# Patient Record
Sex: FEMALE | Race: OTHER | NOT HISPANIC OR LATINO
[De-identification: names, ages, dates, MRNs, and addresses within clinical notes are randomized per-mention and may not be internally consistent; named-entity substitution may affect disease eponyms.]

---

## 2017-03-27 ENCOUNTER — LABORATORY RESULT (OUTPATIENT)
Age: 42
End: 2017-03-27

## 2017-03-27 ENCOUNTER — APPOINTMENT (OUTPATIENT)
Dept: INFECTIOUS DISEASE | Facility: CLINIC | Age: 42
End: 2017-03-27

## 2017-03-27 VITALS
DIASTOLIC BLOOD PRESSURE: 89 MMHG | WEIGHT: 147 LBS | HEART RATE: 102 BPM | OXYGEN SATURATION: 96 % | TEMPERATURE: 98.2 F | HEIGHT: 62 IN | SYSTOLIC BLOOD PRESSURE: 131 MMHG | BODY MASS INDEX: 27.05 KG/M2

## 2017-03-27 DIAGNOSIS — R76.8 OTHER SPECIFIED ABNORMAL IMMUNOLOGICAL FINDINGS IN SERUM: ICD-10-CM

## 2017-03-27 DIAGNOSIS — R53.81 OTHER MALAISE: ICD-10-CM

## 2017-03-27 DIAGNOSIS — R53.83 OTHER MALAISE: ICD-10-CM

## 2017-03-28 ENCOUNTER — TRANSCRIPTION ENCOUNTER (OUTPATIENT)
Age: 42
End: 2017-03-28

## 2017-03-30 ENCOUNTER — OTHER (OUTPATIENT)
Age: 42
End: 2017-03-30

## 2017-03-30 LAB
ALBUMIN SERPL ELPH-MCNC: 4.1 G/DL
ALP BLD-CCNC: 65 U/L
ALT SERPL-CCNC: 8 U/L
ANA SER IF-ACNC: NEGATIVE
ANION GAP SERPL CALC-SCNC: 14 MMOL/L
AST SERPL-CCNC: 19 U/L
B BURGDOR IGG+IGM SER QL IB: NORMAL
BASOPHILS # BLD AUTO: 0.01 K/UL
BASOPHILS NFR BLD AUTO: 0.2 %
BILIRUB SERPL-MCNC: 0.4 MG/DL
BUN SERPL-MCNC: 9 MG/DL
CALCIUM SERPL-MCNC: 9.4 MG/DL
CHLORIDE SERPL-SCNC: 102 MMOL/L
CO2 SERPL-SCNC: 22 MMOL/L
CREAT SERPL-MCNC: 0.87 MG/DL
CRP SERPL-MCNC: <0.2 MG/DL
EOSINOPHIL # BLD AUTO: 0.12 K/UL
EOSINOPHIL NFR BLD AUTO: 1.9 %
ERYTHROCYTE [SEDIMENTATION RATE] IN BLOOD BY WESTERGREN METHOD: 23 MM/HR
GLUCOSE SERPL-MCNC: 85 MG/DL
HCT VFR BLD CALC: 43.3 %
HGB BLD-MCNC: 14.1 G/DL
IMM GRANULOCYTES NFR BLD AUTO: 0 %
LYMPHOCYTES # BLD AUTO: 2.11 K/UL
LYMPHOCYTES NFR BLD AUTO: 33.3 %
MAN DIFF?: NORMAL
MCHC RBC-ENTMCNC: 29.9 PG
MCHC RBC-ENTMCNC: 32.6 GM/DL
MCV RBC AUTO: 91.9 FL
MONOCYTES # BLD AUTO: 0.3 K/UL
MONOCYTES NFR BLD AUTO: 4.7 %
NEUTROPHILS # BLD AUTO: 3.8 K/UL
NEUTROPHILS NFR BLD AUTO: 59.9 %
PLATELET # BLD AUTO: 252 K/UL
POTASSIUM SERPL-SCNC: 4 MMOL/L
PROT SERPL-MCNC: 7.8 G/DL
RBC # BLD: 4.71 M/UL
RBC # FLD: 13.6 %
SODIUM SERPL-SCNC: 138 MMOL/L
TSH SERPL-ACNC: 0.79 UIU/ML
WBC # FLD AUTO: 6.34 K/UL

## 2017-05-04 ENCOUNTER — RX RENEWAL (OUTPATIENT)
Age: 42
End: 2017-05-04

## 2017-05-05 ENCOUNTER — TRANSCRIPTION ENCOUNTER (OUTPATIENT)
Age: 42
End: 2017-05-05

## 2017-05-15 ENCOUNTER — APPOINTMENT (OUTPATIENT)
Dept: PULMONOLOGY | Facility: CLINIC | Age: 42
End: 2017-05-15

## 2017-05-15 VITALS
HEIGHT: 62 IN | BODY MASS INDEX: 27.05 KG/M2 | DIASTOLIC BLOOD PRESSURE: 89 MMHG | TEMPERATURE: 98.4 F | OXYGEN SATURATION: 99 % | HEART RATE: 105 BPM | WEIGHT: 147 LBS | RESPIRATION RATE: 15 BRPM | SYSTOLIC BLOOD PRESSURE: 130 MMHG

## 2017-05-15 DIAGNOSIS — R09.02 HYPOXEMIA: ICD-10-CM

## 2017-05-15 DIAGNOSIS — Z91.018 ALLERGY TO OTHER FOODS: ICD-10-CM

## 2017-05-15 DIAGNOSIS — E78.5 HYPERLIPIDEMIA, UNSPECIFIED: ICD-10-CM

## 2017-05-15 LAB
BASE EXCESS BLDA CALC-SCNC: -3.2
BLOOD GAS COMMENTS ARTERIAL: NORMAL
GAS PNL BLDA: NORMAL
HCO3 BLDA-SCNC: 20.7
HOROWITZ INDEX BLDA+IHG-RTO: NORMAL
PCO2 BLDA: 33.9
PH BLDA: 7.4
PO2 BLDA: 80
SAO2 % BLDA: NORMAL

## 2017-05-15 RX ORDER — SODIUM PICOSULFATE, MAGNESIUM OXIDE, AND ANHYDROUS CITRIC ACID 10; 3.5; 12 MG/16.2G; G/16.2G; G/16.2G
10-3.5-12 POWDER, METERED ORAL
Qty: 2 | Refills: 0 | Status: ACTIVE | COMMUNITY
Start: 2017-03-30

## 2017-05-15 RX ORDER — FLUOCINONIDE 0.5 MG/G
0.05 CREAM TOPICAL
Qty: 60 | Refills: 0 | Status: ACTIVE | COMMUNITY
Start: 2016-05-09

## 2017-05-15 RX ORDER — DEXAMETHASONE 1 MG/1
1 TABLET ORAL DAILY
Qty: 30 | Refills: 3 | Status: ACTIVE | COMMUNITY
Start: 2017-05-15 | End: 1900-01-01

## 2017-11-21 ENCOUNTER — APPOINTMENT (OUTPATIENT)
Dept: MAMMOGRAPHY | Facility: IMAGING CENTER | Age: 42
End: 2017-11-21

## 2017-11-22 ENCOUNTER — APPOINTMENT (OUTPATIENT)
Dept: PULMONOLOGY | Facility: CLINIC | Age: 42
End: 2017-11-22
Payer: COMMERCIAL

## 2017-11-22 VITALS
WEIGHT: 158 LBS | TEMPERATURE: 98.4 F | BODY MASS INDEX: 29.08 KG/M2 | DIASTOLIC BLOOD PRESSURE: 76 MMHG | HEART RATE: 84 BPM | HEIGHT: 62 IN | SYSTOLIC BLOOD PRESSURE: 119 MMHG | OXYGEN SATURATION: 95 %

## 2017-11-22 DIAGNOSIS — J45.909 UNSPECIFIED ASTHMA, UNCOMPLICATED: ICD-10-CM

## 2017-11-22 DIAGNOSIS — R06.2 WHEEZING: ICD-10-CM

## 2017-11-22 DIAGNOSIS — J38.5 LARYNGEAL SPASM: ICD-10-CM

## 2017-11-22 DIAGNOSIS — J45.41 MODERATE PERSISTENT ASTHMA WITH (ACUTE) EXACERBATION: ICD-10-CM

## 2017-11-22 DIAGNOSIS — R06.02 SHORTNESS OF BREATH: ICD-10-CM

## 2017-11-22 LAB
BASE EXCESS BLDA CALC-SCNC: -1.8
BLOOD GAS COMMENTS ARTERIAL: NORMAL
GAS PNL BLDA: NORMAL
HCO3 BLDA-SCNC: 21.7
HOROWITZ INDEX BLDA+IHG-RTO: NORMAL
PCO2 BLDA: 32
PH BLDA: 7.45
PO2 BLDA: 85
SAO2 % BLDA: 97

## 2017-11-22 PROCEDURE — 94664 DEMO&/EVAL PT USE INHALER: CPT

## 2017-11-22 PROCEDURE — 36600 WITHDRAWAL OF ARTERIAL BLOOD: CPT | Mod: 59

## 2017-11-22 PROCEDURE — 82803 BLOOD GASES ANY COMBINATION: CPT

## 2017-11-22 PROCEDURE — 99215 OFFICE O/P EST HI 40 MIN: CPT | Mod: 25

## 2017-11-22 RX ORDER — METHYLPREDNISOLONE 4 MG/1
4 TABLET ORAL
Qty: 1 | Refills: 1 | Status: ACTIVE | COMMUNITY
Start: 2017-11-22 | End: 1900-01-01

## 2017-11-22 RX ORDER — UMECLIDINIUM 62.5 UG/1
62.5 AEROSOL, POWDER ORAL DAILY
Qty: 1 | Refills: 3 | Status: ACTIVE | COMMUNITY
Start: 2017-11-22 | End: 1900-01-01

## 2017-11-22 RX ORDER — LEVALBUTEROL HYDROCHLORIDE 0.63 MG/3ML
0.63 SOLUTION RESPIRATORY (INHALATION)
Qty: 4 | Refills: 3 | Status: ACTIVE | COMMUNITY
Start: 2017-11-22 | End: 1900-01-01

## 2017-11-22 RX ORDER — MUPIROCIN 20 MG/G
2 OINTMENT TOPICAL
Qty: 22 | Refills: 0 | Status: ACTIVE | COMMUNITY
Start: 2017-10-26

## 2018-01-11 ENCOUNTER — APPOINTMENT (OUTPATIENT)
Dept: RHEUMATOLOGY | Facility: CLINIC | Age: 43
End: 2018-01-11

## 2018-08-13 ENCOUNTER — APPOINTMENT (OUTPATIENT)
Dept: MAMMOGRAPHY | Facility: IMAGING CENTER | Age: 43
End: 2018-08-13

## 2018-10-02 ENCOUNTER — OUTPATIENT (OUTPATIENT)
Dept: OUTPATIENT SERVICES | Facility: HOSPITAL | Age: 43
LOS: 1 days | End: 2018-10-02
Payer: COMMERCIAL

## 2018-10-02 ENCOUNTER — APPOINTMENT (OUTPATIENT)
Dept: MAMMOGRAPHY | Facility: IMAGING CENTER | Age: 43
End: 2018-10-02
Payer: COMMERCIAL

## 2018-10-02 DIAGNOSIS — Z00.8 ENCOUNTER FOR OTHER GENERAL EXAMINATION: ICD-10-CM

## 2018-10-02 DIAGNOSIS — Z98.89 OTHER SPECIFIED POSTPROCEDURAL STATES: Chronic | ICD-10-CM

## 2018-10-02 DIAGNOSIS — Z98.82 BREAST IMPLANT STATUS: Chronic | ICD-10-CM

## 2018-10-02 PROCEDURE — 77067 SCR MAMMO BI INCL CAD: CPT | Mod: 26

## 2018-10-02 PROCEDURE — 77063 BREAST TOMOSYNTHESIS BI: CPT | Mod: 26

## 2018-10-02 PROCEDURE — 77063 BREAST TOMOSYNTHESIS BI: CPT

## 2018-10-02 PROCEDURE — 77067 SCR MAMMO BI INCL CAD: CPT

## 2018-10-05 ENCOUNTER — APPOINTMENT (OUTPATIENT)
Dept: MAMMOGRAPHY | Facility: IMAGING CENTER | Age: 43
End: 2018-10-05
Payer: COMMERCIAL

## 2018-10-05 ENCOUNTER — OUTPATIENT (OUTPATIENT)
Dept: OUTPATIENT SERVICES | Facility: HOSPITAL | Age: 43
LOS: 1 days | End: 2018-10-05
Payer: COMMERCIAL

## 2018-10-05 DIAGNOSIS — R92.2 INCONCLUSIVE MAMMOGRAM: ICD-10-CM

## 2018-10-05 DIAGNOSIS — Z98.82 BREAST IMPLANT STATUS: Chronic | ICD-10-CM

## 2018-10-05 DIAGNOSIS — Z98.89 OTHER SPECIFIED POSTPROCEDURAL STATES: Chronic | ICD-10-CM

## 2018-10-05 PROCEDURE — G0279: CPT | Mod: 26

## 2018-10-05 PROCEDURE — 77065 DX MAMMO INCL CAD UNI: CPT | Mod: 26,RT

## 2018-10-05 PROCEDURE — 76641 ULTRASOUND BREAST COMPLETE: CPT | Mod: 26,50

## 2018-10-05 PROCEDURE — 77065 DX MAMMO INCL CAD UNI: CPT

## 2018-10-05 PROCEDURE — G0279: CPT

## 2018-10-05 PROCEDURE — 76641 ULTRASOUND BREAST COMPLETE: CPT

## 2018-10-11 ENCOUNTER — APPOINTMENT (OUTPATIENT)
Dept: ULTRASOUND IMAGING | Facility: IMAGING CENTER | Age: 43
End: 2018-10-11
Payer: COMMERCIAL

## 2018-10-11 ENCOUNTER — RESULT REVIEW (OUTPATIENT)
Age: 43
End: 2018-10-11

## 2018-10-11 ENCOUNTER — OUTPATIENT (OUTPATIENT)
Dept: OUTPATIENT SERVICES | Facility: HOSPITAL | Age: 43
LOS: 1 days | End: 2018-10-11
Payer: COMMERCIAL

## 2018-10-11 DIAGNOSIS — Z98.89 OTHER SPECIFIED POSTPROCEDURAL STATES: Chronic | ICD-10-CM

## 2018-10-11 DIAGNOSIS — Z98.82 BREAST IMPLANT STATUS: Chronic | ICD-10-CM

## 2018-10-11 DIAGNOSIS — Z00.8 ENCOUNTER FOR OTHER GENERAL EXAMINATION: ICD-10-CM

## 2018-10-11 PROCEDURE — 19083 BX BREAST 1ST LESION US IMAG: CPT

## 2018-10-11 PROCEDURE — 88305 TISSUE EXAM BY PATHOLOGIST: CPT | Mod: 26

## 2018-10-11 PROCEDURE — 77065 DX MAMMO INCL CAD UNI: CPT

## 2018-10-11 PROCEDURE — 88305 TISSUE EXAM BY PATHOLOGIST: CPT

## 2018-10-11 PROCEDURE — 77065 DX MAMMO INCL CAD UNI: CPT | Mod: 26,RT

## 2018-10-11 PROCEDURE — 19083 BX BREAST 1ST LESION US IMAG: CPT | Mod: RT

## 2019-03-13 ENCOUNTER — TRANSCRIPTION ENCOUNTER (OUTPATIENT)
Age: 44
End: 2019-03-13

## 2019-04-01 PROBLEM — J38.5 LARYNGEAL STRIDOR: Status: ACTIVE | Noted: 2017-11-22

## 2019-04-01 PROBLEM — J38.5 LARYNGEAL STRIDOR: Status: RESOLVED | Noted: 2017-11-22 | Resolved: 2019-04-01

## 2019-08-09 ENCOUNTER — APPOINTMENT (OUTPATIENT)
Dept: RADIOLOGY | Facility: HOSPITAL | Age: 44
End: 2019-08-09

## 2019-08-09 ENCOUNTER — OUTPATIENT (OUTPATIENT)
Dept: OUTPATIENT SERVICES | Facility: HOSPITAL | Age: 44
LOS: 1 days | End: 2019-08-09
Payer: COMMERCIAL

## 2019-08-09 DIAGNOSIS — Z98.89 OTHER SPECIFIED POSTPROCEDURAL STATES: Chronic | ICD-10-CM

## 2019-08-09 DIAGNOSIS — N97.1 FEMALE INFERTILITY OF TUBAL ORIGIN: ICD-10-CM

## 2019-08-09 DIAGNOSIS — Z98.82 BREAST IMPLANT STATUS: Chronic | ICD-10-CM

## 2019-08-09 PROCEDURE — 58340 CATHETER FOR HYSTEROGRAPHY: CPT

## 2019-08-09 PROCEDURE — 74740 X-RAY FEMALE GENITAL TRACT: CPT | Mod: 26

## 2019-08-09 PROCEDURE — 74740 X-RAY FEMALE GENITAL TRACT: CPT

## 2019-11-04 ENCOUNTER — APPOINTMENT (OUTPATIENT)
Dept: MAMMOGRAPHY | Facility: CLINIC | Age: 44
End: 2019-11-04

## 2019-11-04 ENCOUNTER — APPOINTMENT (OUTPATIENT)
Dept: ULTRASOUND IMAGING | Facility: CLINIC | Age: 44
End: 2019-11-04

## 2020-01-03 ENCOUNTER — APPOINTMENT (OUTPATIENT)
Dept: MAMMOGRAPHY | Facility: IMAGING CENTER | Age: 45
End: 2020-01-03

## 2020-01-03 ENCOUNTER — APPOINTMENT (OUTPATIENT)
Dept: ULTRASOUND IMAGING | Facility: IMAGING CENTER | Age: 45
End: 2020-01-03

## 2020-04-18 ENCOUNTER — APPOINTMENT (OUTPATIENT)
Dept: DISASTER EMERGENCY | Facility: CLINIC | Age: 45
End: 2020-04-18
Payer: COMMERCIAL

## 2020-04-18 VITALS
SYSTOLIC BLOOD PRESSURE: 118 MMHG | WEIGHT: 165 LBS | BODY MASS INDEX: 30.36 KG/M2 | HEIGHT: 62 IN | DIASTOLIC BLOOD PRESSURE: 88 MMHG | HEART RATE: 118 BPM | RESPIRATION RATE: 15 BRPM | OXYGEN SATURATION: 97 % | TEMPERATURE: 99.3 F

## 2020-04-18 DIAGNOSIS — U07.1 COVID-19: ICD-10-CM

## 2020-04-18 DIAGNOSIS — R68.89 OTHER GENERAL SYMPTOMS AND SIGNS: ICD-10-CM

## 2020-04-18 PROCEDURE — 99213 OFFICE O/P EST LOW 20 MIN: CPT

## 2020-04-19 LAB — SARS-COV-2 N GENE NPH QL NAA+PROBE: NOT DETECTED

## 2020-05-02 PROBLEM — R68.89 SUSPECTED COVID-19 VIRUS INFECTION: Status: ACTIVE | Noted: 2020-05-02

## 2020-05-02 NOTE — HISTORY OF PRESENT ILLNESS
[Patient presents to the office today for COVID-19 evaluation and testing.] : Patient presents to the office today for COVID-19 evaluation and testing. [Patient has been pre-screened by RN at call center for appointment today with our facility.] : Patient has been pre-screened by RN at call center for appointment today with our facility. [] : no dizziness on standing [None Known] : none known [Lung Disease] : lung disease [None] : none [Clear] : clear [Normal O2 sat at rest] : normal O2 sat at rest [Grossly normal, interacts, not tired or weak] : grossly normal, interacts, not tired or weak [COVID-19 testing ordered and specimen obtained] : COVID-19 testing ordered and specimen obtained [Discharged with current Quarantine instructions and advised of signs of worsening illness.] : Patient discharged with current quarantine instructions and advised of signs of worsening illness. Patient told to seek emergent care if symptoms occur. [FreeTextEntry1] : This is a 44 year old female who presents for COVID 19 visit:\par c/o shortness of breath at rest, wheezing, cough, fever, headache x 3 days.\par Pt is an asthmatic - takes symbicort daily, has nebulizer treatment at home. \par Lives w/  & son, has been in quarantine at home except for grocery shopping. \par \par \par \par  [TextBox_107] : Patient education provided for COVID-19. Explained if symptoms such as SOB or fever progress, patient instructed to go to ED. Discussed isolation precautions and handwashing techniques. Social distancing reviewed. Utilize Tylenol for fever >101. No signs of cardiovascular decompensation. Patient verbalized understanding of provided instructions. Tests results may take up to 3-7 days to result. Discussed possibility of false negative results. Instructed to self quarantine and must remain quarantined x 14 days and no fever for three days without antipyretic medication. All patient close contacts should also self quarantine. Social distancing once quarantine is completed. If patient has symptoms of chest discomfort, severe SOB at rest, worsening shortness of breath with minimal exertion, new or worsening wheezing, dizziness were instructed to seek immediate medical evaluation. \par \par COVID-19 nasal swab preformed and ordered in office today. Advised test results may take 3-7 days to return. Discussed diagnostic accuracy and possibility of false negative results. Instructed to self quarantine and must remain quarantined x 14 days and no fever for 3 days without antifever medications. All patients close contacts should also self quarantine. Close contacts with comorbidities at higher risk of COVID disease. Social distancing once quarantine is completed. If high risk symptoms of chest discomfort, severe shortness of breath at rest, worsening shortness of breath with minimal exertion, new or worsening wheezing, dizziness then instructed to seek immediate medical evaluation. A letter for work and patient education form was provided. The above plan was reviewed with the patient. All questions have been answered. Instructed to call if any new symptoms.

## 2021-03-29 ENCOUNTER — APPOINTMENT (OUTPATIENT)
Dept: ULTRASOUND IMAGING | Facility: IMAGING CENTER | Age: 46
End: 2021-03-29

## 2021-03-29 ENCOUNTER — APPOINTMENT (OUTPATIENT)
Dept: MAMMOGRAPHY | Facility: IMAGING CENTER | Age: 46
End: 2021-03-29

## 2021-09-25 ENCOUNTER — APPOINTMENT (OUTPATIENT)
Dept: MAMMOGRAPHY | Facility: CLINIC | Age: 46
End: 2021-09-25
Payer: COMMERCIAL

## 2021-09-25 ENCOUNTER — OUTPATIENT (OUTPATIENT)
Dept: OUTPATIENT SERVICES | Facility: HOSPITAL | Age: 46
LOS: 1 days | End: 2021-09-25
Payer: COMMERCIAL

## 2021-09-25 ENCOUNTER — APPOINTMENT (OUTPATIENT)
Dept: ULTRASOUND IMAGING | Facility: CLINIC | Age: 46
End: 2021-09-25
Payer: COMMERCIAL

## 2021-09-25 DIAGNOSIS — Z00.8 ENCOUNTER FOR OTHER GENERAL EXAMINATION: ICD-10-CM

## 2021-09-25 DIAGNOSIS — Z98.89 OTHER SPECIFIED POSTPROCEDURAL STATES: Chronic | ICD-10-CM

## 2021-09-25 DIAGNOSIS — Z98.82 BREAST IMPLANT STATUS: Chronic | ICD-10-CM

## 2021-09-25 PROCEDURE — 76641 ULTRASOUND BREAST COMPLETE: CPT | Mod: 26,50

## 2021-09-25 PROCEDURE — 77067 SCR MAMMO BI INCL CAD: CPT

## 2021-09-25 PROCEDURE — 77067 SCR MAMMO BI INCL CAD: CPT | Mod: 26

## 2021-09-25 PROCEDURE — 77063 BREAST TOMOSYNTHESIS BI: CPT

## 2021-09-25 PROCEDURE — 76641 ULTRASOUND BREAST COMPLETE: CPT

## 2021-09-25 PROCEDURE — 77063 BREAST TOMOSYNTHESIS BI: CPT | Mod: 26

## 2022-02-26 ENCOUNTER — EMERGENCY (EMERGENCY)
Facility: HOSPITAL | Age: 47
LOS: 1 days | Discharge: HOME | End: 2022-02-26
Attending: STUDENT IN AN ORGANIZED HEALTH CARE EDUCATION/TRAINING PROGRAM | Admitting: STUDENT IN AN ORGANIZED HEALTH CARE EDUCATION/TRAINING PROGRAM
Payer: COMMERCIAL

## 2022-02-26 VITALS
DIASTOLIC BLOOD PRESSURE: 90 MMHG | TEMPERATURE: 98 F | SYSTOLIC BLOOD PRESSURE: 132 MMHG | HEART RATE: 102 BPM | RESPIRATION RATE: 16 BRPM | OXYGEN SATURATION: 100 %

## 2022-02-26 VITALS
RESPIRATION RATE: 16 BRPM | SYSTOLIC BLOOD PRESSURE: 144 MMHG | DIASTOLIC BLOOD PRESSURE: 90 MMHG | TEMPERATURE: 98 F | OXYGEN SATURATION: 100 % | HEART RATE: 86 BPM

## 2022-02-26 DIAGNOSIS — Z98.89 OTHER SPECIFIED POSTPROCEDURAL STATES: Chronic | ICD-10-CM

## 2022-02-26 DIAGNOSIS — Z98.82 BREAST IMPLANT STATUS: Chronic | ICD-10-CM

## 2022-02-26 PROCEDURE — 99283 EMERGENCY DEPT VISIT LOW MDM: CPT

## 2022-02-26 RX ORDER — CEFPODOXIME PROXETIL 100 MG
400 TABLET ORAL EVERY 12 HOURS
Refills: 0 | Status: DISCONTINUED | OUTPATIENT
Start: 2022-02-26 | End: 2022-03-01

## 2022-02-26 RX ORDER — CEFPODOXIME PROXETIL 100 MG
200 TABLET ORAL EVERY 12 HOURS
Refills: 0 | Status: DISCONTINUED | OUTPATIENT
Start: 2022-02-26 | End: 2022-02-26

## 2022-02-26 RX ORDER — OXYCODONE AND ACETAMINOPHEN 5; 325 MG/1; MG/1
1 TABLET ORAL ONCE
Refills: 0 | Status: DISCONTINUED | OUTPATIENT
Start: 2022-02-26 | End: 2022-02-26

## 2022-02-26 RX ORDER — CEFDINIR 250 MG/5ML
1 POWDER, FOR SUSPENSION ORAL
Qty: 14 | Refills: 0
Start: 2022-02-26 | End: 2022-03-04

## 2022-02-26 RX ADMIN — Medication 100 MILLIGRAM(S): at 04:14

## 2022-02-26 RX ADMIN — OXYCODONE AND ACETAMINOPHEN 1 TABLET(S): 5; 325 TABLET ORAL at 04:14

## 2022-02-26 RX ADMIN — Medication 400 MILLIGRAM(S): at 04:17

## 2022-02-26 NOTE — ED PROVIDER NOTE - OBJECTIVE STATEMENT
46yF PMHx asthma and MRSA abscess to the scalp 4 months ago presents with erythema, pain and swelling to the R jaw x3 days. Pt had a pimple which she popped after which the swelling and pain gradually developed. Pt denies fever, drainage from the area, jaw claudication, pain with chewing, pain in the teeth, injury to the area.

## 2022-02-26 NOTE — ED PROVIDER NOTE - NSICDXPASTSURGICALHX_GEN_ALL_CORE_FT
PAST SURGICAL HISTORY:  Delivered by  section     S/P bilateral breast implants     S/P laparoscopic cholecystectomy

## 2022-02-26 NOTE — ED ADULT NURSE NOTE - HISTORY OF COVID-19 VACCINATION
Mother it calling back again waiting at pharmacy still no script  Please call when this has been faxed to pharmacy  Thank you   Yes

## 2022-02-26 NOTE — ED PROVIDER NOTE - PROGRESS NOTE DETAILS
Francisco Javier AGUILAR, EM/IM PGY-1: No drainable collection, multiple tiny fluid pockets, will attempt to needle aspirate and DC with Abx that cover MRSA.

## 2022-02-26 NOTE — ED PROVIDER NOTE - CLINICAL SUMMARY MEDICAL DECISION MAKING FREE TEXT BOX
46yF PMHx asthma and MRSA abscess presents with new jaw pain and swelling. Vital signs normal, exam shows area or tender, red swelling with fluctuance and pustule. Will give Pain medication, US to check for drainable collection.

## 2022-02-26 NOTE — ED PROVIDER NOTE - NSFOLLOWUPINSTRUCTIONS_ED_ALL_ED_FT
take acetaminophen 650 mg every 6 hours and ibuprofen 400 mg every 6 hours with food. Both of these medications work differently to treat pain and inflammation.     Abscess    WHAT YOU NEED TO KNOW:    A warm compress may help your abscess drain. Your healthcare provider may make a cut in the abscess so it can drain. You may need surgery to remove an abscess that is on your hands or buttocks.  Skin Abscess    DISCHARGE INSTRUCTIONS:    Return to the emergency department if:    The area around your abscess becomes very painful, warm, or has red streaks.    You have a fever and chills.    Your heart is beating faster than usual.    You feel faint or confused.  Call your doctor if:    Your abscess gets bigger or does not get better.    Your abscess returns.    You have questions or concerns about your condition or care.  Medicines: You may need any of the following:    Antibiotics help treat a bacterial infection.    Acetaminophen decreases pain and fever. It is available without a doctor's order. Ask how much to take and how often to take it. Follow directions. Read the labels of all other medicines you are using to see if they also contain acetaminophen, or ask your doctor or pharmacist. Acetaminophen can cause liver damage if not taken correctly. Do not use more than 4 grams (4,000 milligrams) total of acetaminophen in one day.    NSAIDs, such as ibuprofen, help decrease swelling, pain, and fever. This medicine is available with or without a doctor's order. NSAIDs can cause stomach bleeding or kidney problems in certain people. If you take blood thinner medicine, always ask your healthcare provider if NSAIDs are safe for you. Always read the medicine label and follow directions.    Take your medicine as directed. Contact your healthcare provider if you think your medicine is not helping or if you have side effects. Tell him or her if you are allergic to any medicine. Keep a list of the medicines, vitamins, and herbs you take. Include the amounts, and when and why you take them. Bring the list or the pill bottles to follow-up visits. Carry your medicine list with you in case of an emergency.  Self-care:    Apply a warm compress to your abscess. This will help it open and drain. Wet a washcloth in warm, but not hot, water. Apply the compress for 10 minutes. Repeat this 4 times each day. Do not press on an abscess or try to open it with a needle. You may push the bacteria deeper or into your blood.    Do not share your clothes, towels, or sheets with anyone. This can spread the infection to others.    Wash your hands often. This can help prevent the spread of germs. Use soap and water or an alcohol-based hand rub.  Handwashing  Care for your wound after it is drained:    Care for your wound as directed. If your healthcare provider says it is okay, carefully remove the bandage and gauze packing. You may need to soak the gauze to get it out of your wound. Clean your wound and the area around it as directed. Dry the area and put on new, clean bandages. Change your bandages when they get wet or dirty.    Ask your healthcare provider how to change the gauze in your wound. Keep track of how many pieces of gauze are placed inside the wound. Do not put too much packing in the wound. Do not pack the gauze too tightly in your wound.  Follow up with your healthcare provider in 1 to 3 days: You may need to have your packing removed or your bandage changed. Write down your questions so you remember to ask them during your visits.

## 2022-02-26 NOTE — ED PROVIDER NOTE - ATTENDING CONTRIBUTION TO CARE
I (Christiano) agree with above, I performed a history and physical. Counseled meghana medical staff, physician assistant, and/or medical student on medical decision making as documented. Medical decisions and treatment interventions were made in real time during the patient encounter. Additionally and/or with the following exceptions: The patient presented to the ED with swelling and pain to angle of right mandible near mentum, no systemic infectious signs, has a history of mrsa abscess in the past. Needle aspiration performed with return of ~0.10mL of exudate. Given pain control, double covered with cefdinir and doxy, strict return precautions for possible scalpal I and D; however suspect that the needle aspiration will be sufficient. Return precautions reviewed. Patient verbalized understand of conditions for return and plan for follow up.

## 2022-02-26 NOTE — ED PROVIDER NOTE - PHYSICAL EXAMINATION
GENERAL: Sitting comfortably in bed in no acute distress  NEURO: Alert and Oriented to person, place, date and situation. Pupils symmetric, No ptosis. No facial asymmetry or dysarthria, no tremor noted.  HEENT: No conjunctival injection or scleral icterus. R jaw 1in x 1in erythematous edematous dome shaped lesion, tender to light touch, pinpoint pustule, no drainage. No tenderness to any teeth, swelling or injury inside the mouth, tenderness or swelling underneath the tongue. One 0.5cm diameter lymph node, mobile, underneath the R jaw.  CARD: Normal rate and regular rhythm, no murmurs and no gallops appreciated.  RESP: Clear to auscultation bilaterally, No wheezes, rales or rhonchi. Good respiratory effort.  ABD: Bowel sounds active. Nondistended, Soft and nontender to palpation in all quadrants, no guarding, no rigidity. No masses appreciated.

## 2022-02-26 NOTE — ED PROVIDER NOTE - NS ED ROS FT
CONSTITUTIONAL - No fever, No weight change, No lightheadedness  SKIN - No rash, (+) swelilng and erythema of the jaw  HEMATOLOGIC - No abnormal bleeding or bruising  EYES - No eye pain, No blurred vision  ENT - No change in hearing, No sore throat, No neck pain, No rhinorrhea, No ear pain  RESPIRATORY - No shortness of breath, No cough  CARDIAC -No chest pain, No palpitations  GI - No abdominal pain, No nausea, No vomiting, No diarrhea, No constipation  - No dysuria, no frequency, no hematuria.   MUSCULOSKELETAL - No joint pain, No swelling, No back pain  NEUROLOGIC - No numbness, No focal weakness, No headache, No dizziness

## 2022-02-26 NOTE — ED ADULT NURSE NOTE - OBJECTIVE STATEMENT
Pt received to room 14 a/o x 3 c/o right face abscess x 3 days. Pt states it started out as a pimple that she tried to pop it. Pt  states it progressively got worse and is painful. Respirations even and unlabored. Lung sounds clear with equal chest rise bilaterally. No complaints of chest pain, headache, nausea, dizziness, vomiting  SOB, fever, chills verbalized.

## 2022-02-26 NOTE — ED PROVIDER NOTE - PATIENT PORTAL LINK FT
You can access the FollowMyHealth Patient Portal offered by [facility] by registering at the following website: [email or web address]. By joining [employer]’s FollowMyHealth portal, you will also be able to view your health information using other applications (apps) compatible with our system.

## 2022-04-07 ENCOUNTER — APPOINTMENT (OUTPATIENT)
Dept: MAMMOGRAPHY | Facility: CLINIC | Age: 47
End: 2022-04-07

## 2022-04-07 ENCOUNTER — APPOINTMENT (OUTPATIENT)
Dept: ULTRASOUND IMAGING | Facility: CLINIC | Age: 47
End: 2022-04-07

## 2022-05-15 ENCOUNTER — NON-APPOINTMENT (OUTPATIENT)
Age: 47
End: 2022-05-15

## 2022-09-14 ENCOUNTER — APPOINTMENT (OUTPATIENT)
Dept: MRI IMAGING | Facility: CLINIC | Age: 47
End: 2022-09-14

## 2022-10-05 ENCOUNTER — OUTPATIENT (OUTPATIENT)
Dept: OUTPATIENT SERVICES | Facility: HOSPITAL | Age: 47
LOS: 1 days | End: 2022-10-05
Payer: COMMERCIAL

## 2022-10-05 ENCOUNTER — APPOINTMENT (OUTPATIENT)
Dept: ULTRASOUND IMAGING | Facility: CLINIC | Age: 47
End: 2022-10-05

## 2022-10-05 ENCOUNTER — APPOINTMENT (OUTPATIENT)
Dept: MAMMOGRAPHY | Facility: CLINIC | Age: 47
End: 2022-10-05

## 2022-10-05 DIAGNOSIS — Z00.8 ENCOUNTER FOR OTHER GENERAL EXAMINATION: ICD-10-CM

## 2022-10-05 DIAGNOSIS — Z98.82 BREAST IMPLANT STATUS: Chronic | ICD-10-CM

## 2022-10-05 DIAGNOSIS — Z98.89 OTHER SPECIFIED POSTPROCEDURAL STATES: Chronic | ICD-10-CM

## 2022-10-05 PROCEDURE — 76641 ULTRASOUND BREAST COMPLETE: CPT | Mod: 26,50

## 2022-10-05 PROCEDURE — 77067 SCR MAMMO BI INCL CAD: CPT | Mod: 26

## 2022-10-05 PROCEDURE — 77063 BREAST TOMOSYNTHESIS BI: CPT | Mod: 26

## 2022-10-05 PROCEDURE — 77063 BREAST TOMOSYNTHESIS BI: CPT

## 2022-10-05 PROCEDURE — 77067 SCR MAMMO BI INCL CAD: CPT

## 2022-10-05 PROCEDURE — 76641 ULTRASOUND BREAST COMPLETE: CPT

## 2023-01-28 ENCOUNTER — EMERGENCY (EMERGENCY)
Facility: HOSPITAL | Age: 48
LOS: 1 days | Discharge: HOME | End: 2023-01-28
Attending: STUDENT IN AN ORGANIZED HEALTH CARE EDUCATION/TRAINING PROGRAM | Admitting: STUDENT IN AN ORGANIZED HEALTH CARE EDUCATION/TRAINING PROGRAM
Payer: COMMERCIAL

## 2023-01-28 VITALS
TEMPERATURE: 99 F | RESPIRATION RATE: 16 BRPM | DIASTOLIC BLOOD PRESSURE: 95 MMHG | OXYGEN SATURATION: 98 % | SYSTOLIC BLOOD PRESSURE: 146 MMHG | HEART RATE: 127 BPM

## 2023-01-28 DIAGNOSIS — Z98.82 BREAST IMPLANT STATUS: Chronic | ICD-10-CM

## 2023-01-28 DIAGNOSIS — Z98.89 OTHER SPECIFIED POSTPROCEDURAL STATES: Chronic | ICD-10-CM

## 2023-01-28 PROCEDURE — 99284 EMERGENCY DEPT VISIT MOD MDM: CPT

## 2023-01-28 NOTE — ED ADULT TRIAGE NOTE - CHIEF COMPLAINT QUOTE
alert oriented c/o low back pain radiating to both thighs x 1 month also c/o pelvic pain  has been urinating frequently no dysuria  c/o dizziness started 1 week ago  PMHx asthma

## 2023-01-29 VITALS
OXYGEN SATURATION: 100 % | SYSTOLIC BLOOD PRESSURE: 122 MMHG | DIASTOLIC BLOOD PRESSURE: 71 MMHG | RESPIRATION RATE: 16 BRPM | TEMPERATURE: 98 F | HEART RATE: 98 BPM

## 2023-01-29 LAB
APPEARANCE UR: CLEAR — SIGNIFICANT CHANGE UP
BACTERIA # UR AUTO: ABNORMAL
BILIRUB UR-MCNC: NEGATIVE — SIGNIFICANT CHANGE UP
COLOR SPEC: YELLOW — SIGNIFICANT CHANGE UP
DIFF PNL FLD: ABNORMAL
EPI CELLS # UR: 3 /HPF — SIGNIFICANT CHANGE UP (ref 0–5)
GLUCOSE UR QL: NEGATIVE — SIGNIFICANT CHANGE UP
HYALINE CASTS # UR AUTO: 1 /LPF — SIGNIFICANT CHANGE UP (ref 0–7)
KETONES UR-MCNC: ABNORMAL
LEUKOCYTE ESTERASE UR-ACNC: NEGATIVE — SIGNIFICANT CHANGE UP
NITRITE UR-MCNC: NEGATIVE — SIGNIFICANT CHANGE UP
PH UR: 6 — SIGNIFICANT CHANGE UP (ref 5–8)
PROT UR-MCNC: ABNORMAL
RBC CASTS # UR COMP ASSIST: 9 /HPF — HIGH (ref 0–4)
SP GR SPEC: 1.03 — SIGNIFICANT CHANGE UP (ref 1.01–1.05)
UROBILINOGEN FLD QL: SIGNIFICANT CHANGE UP
WBC UR QL: 3 /HPF — SIGNIFICANT CHANGE UP (ref 0–5)

## 2023-01-29 PROCEDURE — 72100 X-RAY EXAM L-S SPINE 2/3 VWS: CPT | Mod: 26

## 2023-01-29 RX ORDER — KETOROLAC TROMETHAMINE 30 MG/ML
15 SYRINGE (ML) INJECTION ONCE
Refills: 0 | Status: DISCONTINUED | OUTPATIENT
Start: 2023-01-29 | End: 2023-01-29

## 2023-01-29 RX ORDER — LIDOCAINE 4 G/100G
1 CREAM TOPICAL ONCE
Refills: 0 | Status: COMPLETED | OUTPATIENT
Start: 2023-01-29 | End: 2023-01-29

## 2023-01-29 RX ORDER — CYCLOBENZAPRINE HYDROCHLORIDE 10 MG/1
1 TABLET, FILM COATED ORAL
Qty: 9 | Refills: 0
Start: 2023-01-29 | End: 2023-01-31

## 2023-01-29 RX ADMIN — Medication 15 MILLIGRAM(S): at 01:47

## 2023-01-29 RX ADMIN — LIDOCAINE 1 PATCH: 4 CREAM TOPICAL at 01:47

## 2023-01-29 NOTE — ED PROVIDER NOTE - PROGRESS NOTE DETAILS
Tunde Garcia MD PGY-1: Pt reassessed. Pain is much better now a 5/10, pt is able to sit and lie on her back. Pt feels comfortable going home and following up with her PCP. Xray and UA results explained, nonactionable.

## 2023-01-29 NOTE — ED PROVIDER NOTE - WR ORDER DATE AND TIME 1
Reason for Disposition   COVID-19 vaccine, systemic reactions (e.g., fatigue, fever, muscle aches), questions about    Answer Assessment - Initial Assessment Questions  1. MAIN CONCERN OR SYMPTOM:  \"What is your main concern right now? \" \"What question do you have? \" \"What's the main symptom you're worried about? \" (e.g., fever, pain, redness, swelling)      Fever    2. VACCINE: \"What vaccination did you receive? \" \"Is this your first or second shot? \" (e.g., none; Stephanie Alex, other)      J&J - 1st and only shot    3. SYMPTOM ONSET: \"When did the fever begin? \" (e.g., not relevant; hours, days)       Around 6 hours ago    4. SYMPTOM SEVERITY: \"How bad is it? \"       Patient reports her entire body feels bad    5. FEVER: \"Is there a fever? \" If so, ask: \"What is it, how was it measured, and when did it start? \"       Most recent temperature was 102.1, oral, noticed around 7pm    6. PAST REACTIONS: \"Have you reacted to immunizations before? \" If so, ask: \"What happened? \"      Denies hx of reactions to other vaccines    7. OTHER SYMPTOMS: \"Do you have any other symptoms? \"      Dizziness, body aches, joint pain    Protocols used: CORONAVIRUS (COVID-19) VACCINE QUESTIONS AND REACTIONS-ADULT-    Call received on [address]. Care advice provided. Recommended using local department of health website for testing locations and up to date information. Caller verbalizes understanding, denies any other questions or concerns; instructed to call back for any new or worsening symptoms. 29-Jan-2023 01:22

## 2023-01-29 NOTE — ED ADULT NURSE NOTE - OBJECTIVE STATEMENT
47 yof presents A&Ox4 c/o b/l lower back pain radiating to b/l legs x1 month and urinary frequency x 1 wk. Denies any abdomen pain, diarrhea, vomiting or fevers.PMHx asthma. Respirations even and unlabored, sating 100% on RA, abdomen non tender x4 quadrants. pt denies any chest pain, sob, N/V/D. No acute distress noted. MD at bedside performing assessment. Pending further orders. bed in lowest position, side rails up, call bell in hand, safety maintained.

## 2023-01-29 NOTE — ED PROVIDER NOTE - NS ED ROS FT
REVIEW OF SYSTEMS:     CONSTITUTIONAL: No fever, weight loss, or fatigue  EYES: No eye pain, visual disturbances, or discharge  ENMT:  No difficulty hearing, tinnitus, vertigo; No sinus or throat pain  NECK: No pain or stiffness  RESPIRATORY: No cough, wheezing, chills or hemoptysis; No shortness of breath  CARDIOVASCULAR: No chest pain, palpitations, +dizziness, no leg swelling  GASTROINTESTINAL: No abdominal or epigastric pain. No nausea, vomiting, or hematemesis; No diarrhea or constipation. No melena or hematochezia.  GENITOURINARY: No dysuria, +frequency, no hematuria, or incontinence  NEUROLOGICAL: No headaches, memory loss, loss of strength, numbness, or tremors  SKIN: No itching, burning, rashes, or lesions  MSK: +back pain

## 2023-01-29 NOTE — ED ADULT NURSE NOTE - BEFAST SPEECH PHRASE
Pt admitted to Inpatient status for NSTEMI with Troponin significantly elevated.  Cardiology consulted with Heparin infusion initiated.  EKG positive for ST depression.  Pt placed on BIPAP due to respiratory distress and later weaned to high flow oxygen.  Pulmonology consulted.  Chest xray verified pulmonary congestion with positive response to diuresis noted.  BNP elevated. Pt noted to renal failure with Creatinine of 4 and Nephrology consulted.  Baseline creatinine 2.5-3 noted.  Echo results showed EF 45% with diastolic dysfunction, PAP 69, +WMA, Pulm HTN, and mild to moderate MR.  LHC planned for tomorrow. H/H 8.2/25.3 with 1 unit of PRBCs transfused.  Procalcitonin of 0.66.  Blood culture results pending.  Pt denies acute distress at this time. As of 11/30 R/LHC scheduled today showed Lad prox 90% mid 50% lcx prox 50% rca diffusely diseased 70% normal filling pressures moderate pulmonary htn. Cardiology recommending CABG vs LAD arthrectomy stent. CVT consulted, not amenable to CABG due to severe calcification and high risk of morbidity and mortality. 12/3/18- Awaiting LHC today with intervention of LAD. Denies any chest pain or SOB. 12/4/18- S/p PCI to LAD. Tolerated procedure well. Renal function stable this morning. Plans inplace for OP Nephrology follow up and HD access implant. Patient to follow-up with PCP in 3 days for hospital follow-up of NSTEMI. Patient also to follow-up with cardiology, nephrology, and vascular surgery outpatient. Patient seen and examined on the date of discharge and found suitable for discharge.     Yes

## 2023-01-29 NOTE — ED PROVIDER NOTE - OBJECTIVE STATEMENT
47yF w/PMHx asthma and psoriasis presents with x1 month of atraumatic, intermittent lower back pain that radiates to b/l legs to mid thighs laterally. Pain is described as sometimes sharp, sometimes throbbing was a 6/10 in the beginning of the month now a 10/10. Pt can no longer sit on her butt due to the pain and has to sleep on her side as laying on her back exacerbates the pain. Pt saw PCP for this and was prescribed celecoxib 200mg once a day for the pain. Pt took it for 2 weeks and had no relief so discontinued it herself. Pt states 800mg ibuprofen provides some relief. Pt also endorsing x1 week of increased frequency of urination but no burning/dysuria. Denies saddle anesthesia, denies bowel or bladder loss of control/incontinence. Pt felt a couple seconds of dizziness yesterday due to the pain however that resolved. Denies fevers, +chills, denies cough, congestion, rhinorrhea, cp, sob, abd pain, n/v/c/d.

## 2023-01-29 NOTE — ED POST DISCHARGE NOTE - RESULT SUMMARY
Xray lumber spine: Spondylosis with degenerative apperaing spondylolisthesis of L5 and S1. Consider MRI of the lumbar spine for further evaluation. Patient contacted discussed with patient becca results and to follow up with MD and consider having MRI. Emailed results to [email or web address] so patient can show to MD. Discussed with patient need to return to ED if symptoms don't continue to improve or recur or develops any new or worsening symptoms that are of concern.

## 2023-01-29 NOTE — ED PROVIDER NOTE - NSFOLLOWUPINSTRUCTIONS_ED_ALL_ED_FT
You were evaluated in the emergency department for lower back pain.  Your results were discussed with you and are attached.  You do not have a UTI.  You do not have a fracture seen in your x-ray.  Please follow-up with your primary care doctor in a week.  You will likely need physical therapy to ultimately manage your back pain.  You may take Tylenol or ibuprofen for your back pain.  You can also purchase over-the-counter lidocaine patches from any pharmacy called [facility].    -You may take 975 mg Tylenol (acetaminophen) every six hours as needed for pain.  -You may take 600mg Ibuprofen (Advil) once every 6 hours as needed for pain. See medication label for warnings and use instructions.  -[facility] lidocaine patches over the counter    Back Pain    Back pain is very common in adults. The cause of back pain is rarely dangerous and the pain often gets better over time. The cause of your back pain may not be known and may include strain of muscles or ligaments, degeneration of the spinal disks, or arthritis. Occasionally the pain may radiate down your leg(s). Over-the-counter medicines to reduce pain and inflammation are often the most helpful. Stretching and remaining active frequently helps the healing process.     SEEK IMMEDIATE MEDICAL CARE IF YOU HAVE ANY OF THE FOLLOWING SYMPTOMS: bowel or bladder control problems, unusual weakness or numbness in your arms or legs, nausea or vomiting, abdominal pain, fever, dizziness/lightheadedness. You were evaluated in the emergency department for lower back pain.  Your results were discussed with you and are attached.  You do not have a UTI.  You do not have a fracture seen in your x-ray.  Please follow-up with your primary care doctor in a week.  You will likely need physical therapy to ultimately manage your back pain.  You may take Tylenol or ibuprofen for your back pain.  You can also purchase over-the-counter lidocaine patches from any pharmacy called [facility].    -You may take 975 mg Tylenol (acetaminophen) every six hours as needed for pain.  -You may take 600mg Ibuprofen (Advil) once every 6 hours as needed for pain. See medication label for warnings and use instructions.  -[facility] lidocaine patches over the counter  -For severe pain not getting better with any of the above, you have been prescribed a muscle relaxer  called Flexeril. You can take this once every 8 hours as needed. This medication may cause drowsiness so DO NOT drive, operate heavy machinery, or drink alcohol while taking this medication.      Back Pain    Back pain is very common in adults. The cause of back pain is rarely dangerous and the pain often gets better over time. The cause of your back pain may not be known and may include strain of muscles or ligaments, degeneration of the spinal disks, or arthritis. Occasionally the pain may radiate down your leg(s). Over-the-counter medicines to reduce pain and inflammation are often the most helpful. Stretching and remaining active frequently helps the healing process.     SEEK IMMEDIATE MEDICAL CARE IF YOU HAVE ANY OF THE FOLLOWING SYMPTOMS: bowel or bladder control problems, unusual weakness or numbness in your arms or legs, nausea or vomiting, abdominal pain, fever, dizziness/lightheadedness.

## 2023-01-29 NOTE — ED PROVIDER NOTE - PATIENT PORTAL LINK FT
You can access the FollowMyHealth Patient Portal offered by [facility] by registering at the following website: [email or web address]. By joining [employer]’s FollowMyHealth portal, you will also be able to view your health information using other applications (apps) compatible with our system.

## 2023-01-29 NOTE — ED PROVIDER NOTE - ATTENDING CONTRIBUTION TO CARE
47-year-old female past medical history chronic asthma presents for 1 month of right lower back pain that intermittently radiates to bilateral legs.  No clear inciting event.  Does report chronic heavy lifting.  Pain worse with sitting or with lying on back.  Able to ambulate.  Pain intermittently radiates down the right or left leg but currently nonradiating.  Seen by PCP and prescribed celecoxib without significant relief.  Does report taking Motrin 800 mg with some relief, did not take today.  Patient does also report 1 week of urinary frequency without dysuria hematuria.  She denies fever, chills, extremity numbness or weakness, bowel or bladder incontinence or retention.  Denies any abdominal pain, denies chest pain.  Exam as above, no midline tenderness pain in the right lower lumbar paraspinal region but not tender on exam.  Straight leg raise test positive (patient reports right lower lumbar paraspinal pain  Left leg flexed, no pain with right leg flexion).  Sensation intact in all 4 extremities and abdomen and back, motor 5 out of 5 in all extremities.  Right lumbar paraspinal pain for the past month.  Suspect likely MSK/disc related.  Low suspicion for fracture.  Exam and history not consistent with cord syndrome, epidural abscess.  Plan symptom relief, x-ray, reassess.  Given urinary frequency will assess for UTI.

## 2023-01-29 NOTE — ED PROVIDER NOTE - CLINICAL SUMMARY MEDICAL DECISION MAKING FREE TEXT BOX
47yF hx asthma, psoriasis presents with t4ftpas atraumatic back pain no red flag symptoms. Exam shows ttp to midline lumbar spine around L4, -Slr b/l. DDx includes sciatica vs paraspinal muscle strain vs other msk pain. Will give lidocaine patch, toradol iv, lumbar xray,  ua/uc, poc ucg. Dispo pending clinical course.

## 2023-01-29 NOTE — ED PROVIDER NOTE - PHYSICAL EXAMINATION
GENERAL: well appearing in no acute distress, non-toxic appearing  HEAD: normocephalic, atraumatic  HEENT: normal conjunctiva, neck supple, no JVD  CARDIAC: regular rate and rhythm, normal S1S2, no appreciable murmurs, 2+ pulses in UE/LE b/l  PULM: normal breath sounds, clear to ascultation bilaterally, no rales, rhonchi, wheezing  GI: abdomen nondistended, soft, nontender, no guarding, rebound tenderness  : no suprapubic tenderness  NEURO: normal speech, PERRL, EOMI, normal gait, AAOx3  MSK: +ttp to midline lumbar spine, no peripheral edema  SKIN: well-perfused, extremities warm, no visible rashes  PSYCH: appropriate mood and affect

## 2023-01-30 ENCOUNTER — NON-APPOINTMENT (OUTPATIENT)
Age: 48
End: 2023-01-30

## 2023-01-30 PROBLEM — L40.9 PSORIASIS, UNSPECIFIED: Chronic | Status: ACTIVE | Noted: 2023-01-29

## 2023-01-30 LAB
CULTURE RESULTS: SIGNIFICANT CHANGE UP
SPECIMEN SOURCE: SIGNIFICANT CHANGE UP

## 2023-01-31 ENCOUNTER — APPOINTMENT (OUTPATIENT)
Age: 48
End: 2023-01-31
Payer: COMMERCIAL

## 2023-01-31 VITALS — BODY MASS INDEX: 30.36 KG/M2 | WEIGHT: 165 LBS | HEIGHT: 62 IN

## 2023-01-31 PROCEDURE — 99204 OFFICE O/P NEW MOD 45 MIN: CPT

## 2023-01-31 RX ORDER — PREDNISONE 10 MG/1
10 TABLET ORAL
Qty: 30 | Refills: 0 | Status: ACTIVE | COMMUNITY
Start: 2023-01-31 | End: 1900-01-01

## 2023-01-31 RX ORDER — CYCLOBENZAPRINE HYDROCHLORIDE 5 MG/1
5 TABLET, FILM COATED ORAL
Qty: 30 | Refills: 1 | Status: ACTIVE | COMMUNITY
Start: 2023-01-31 | End: 1900-01-01

## 2023-04-06 ENCOUNTER — APPOINTMENT (OUTPATIENT)
Dept: ORTHOPEDIC SURGERY | Facility: CLINIC | Age: 48
End: 2023-04-06
Payer: COMMERCIAL

## 2023-04-06 DIAGNOSIS — M51.36 OTHER INTERVERTEBRAL DISC DEGENERATION, LUMBAR REGION: ICD-10-CM

## 2023-04-06 PROCEDURE — 99442: CPT

## 2023-04-06 RX ORDER — MELOXICAM 15 MG/1
15 TABLET ORAL
Qty: 30 | Refills: 1 | Status: ACTIVE | COMMUNITY
Start: 2023-01-31 | End: 1900-01-01

## 2023-04-06 RX ORDER — CYCLOBENZAPRINE HYDROCHLORIDE 5 MG/1
5 TABLET, FILM COATED ORAL
Qty: 30 | Refills: 1 | Status: ACTIVE | COMMUNITY
Start: 2023-04-06 | End: 1900-01-01

## 2023-07-24 ENCOUNTER — NON-APPOINTMENT (OUTPATIENT)
Age: 48
End: 2023-07-24

## 2023-10-06 ENCOUNTER — APPOINTMENT (OUTPATIENT)
Dept: ULTRASOUND IMAGING | Facility: IMAGING CENTER | Age: 48
End: 2023-10-06
Payer: COMMERCIAL

## 2023-10-06 ENCOUNTER — OUTPATIENT (OUTPATIENT)
Dept: OUTPATIENT SERVICES | Facility: HOSPITAL | Age: 48
LOS: 1 days | End: 2023-10-06
Payer: COMMERCIAL

## 2023-10-06 ENCOUNTER — APPOINTMENT (OUTPATIENT)
Dept: MAMMOGRAPHY | Facility: IMAGING CENTER | Age: 48
End: 2023-10-06
Payer: COMMERCIAL

## 2023-10-06 DIAGNOSIS — Z00.8 ENCOUNTER FOR OTHER GENERAL EXAMINATION: ICD-10-CM

## 2023-10-06 DIAGNOSIS — Z98.89 OTHER SPECIFIED POSTPROCEDURAL STATES: Chronic | ICD-10-CM

## 2023-10-06 PROCEDURE — 76641 ULTRASOUND BREAST COMPLETE: CPT | Mod: 26,50

## 2023-10-06 PROCEDURE — 77067 SCR MAMMO BI INCL CAD: CPT | Mod: 26

## 2023-10-06 PROCEDURE — 77063 BREAST TOMOSYNTHESIS BI: CPT

## 2023-10-06 PROCEDURE — 76641 ULTRASOUND BREAST COMPLETE: CPT

## 2023-10-06 PROCEDURE — 77063 BREAST TOMOSYNTHESIS BI: CPT | Mod: 26

## 2023-10-06 PROCEDURE — 77067 SCR MAMMO BI INCL CAD: CPT

## 2024-08-28 DIAGNOSIS — Z12.39 ENCOUNTER FOR OTHER SCREENING FOR MALIGNANT NEOPLASM OF BREAST: ICD-10-CM

## 2024-08-28 DIAGNOSIS — N60.19 DIFFUSE CYSTIC MASTOPATHY OF UNSPECIFIED BREAST: ICD-10-CM

## 2024-10-23 ENCOUNTER — APPOINTMENT (OUTPATIENT)
Dept: OBGYN | Facility: CLINIC | Age: 49
End: 2024-10-23
Payer: COMMERCIAL

## 2024-10-23 ENCOUNTER — RESULT REVIEW (OUTPATIENT)
Age: 49
End: 2024-10-23

## 2024-10-23 ENCOUNTER — APPOINTMENT (OUTPATIENT)
Dept: MAMMOGRAPHY | Facility: IMAGING CENTER | Age: 49
End: 2024-10-23
Payer: COMMERCIAL

## 2024-10-23 ENCOUNTER — APPOINTMENT (OUTPATIENT)
Dept: ULTRASOUND IMAGING | Facility: IMAGING CENTER | Age: 49
End: 2024-10-23
Payer: COMMERCIAL

## 2024-10-23 ENCOUNTER — NON-APPOINTMENT (OUTPATIENT)
Age: 49
End: 2024-10-23

## 2024-10-23 ENCOUNTER — ASOB RESULT (OUTPATIENT)
Age: 49
End: 2024-10-23

## 2024-10-23 ENCOUNTER — OUTPATIENT (OUTPATIENT)
Dept: OUTPATIENT SERVICES | Facility: HOSPITAL | Age: 49
LOS: 1 days | End: 2024-10-23
Payer: COMMERCIAL

## 2024-10-23 VITALS
DIASTOLIC BLOOD PRESSURE: 91 MMHG | BODY MASS INDEX: 26.5 KG/M2 | HEART RATE: 77 BPM | SYSTOLIC BLOOD PRESSURE: 148 MMHG | WEIGHT: 144 LBS | HEIGHT: 62 IN

## 2024-10-23 DIAGNOSIS — Z12.4 ENCOUNTER FOR SCREENING FOR MALIGNANT NEOPLASM OF CERVIX: ICD-10-CM

## 2024-10-23 DIAGNOSIS — D25.9 LEIOMYOMA OF UTERUS, UNSPECIFIED: ICD-10-CM

## 2024-10-23 DIAGNOSIS — Z11.51 ENCOUNTER FOR SCREENING FOR HUMAN PAPILLOMAVIRUS (HPV): ICD-10-CM

## 2024-10-23 DIAGNOSIS — Z98.82 BREAST IMPLANT STATUS: Chronic | ICD-10-CM

## 2024-10-23 DIAGNOSIS — Z00.8 ENCOUNTER FOR OTHER GENERAL EXAMINATION: ICD-10-CM

## 2024-10-23 DIAGNOSIS — Z12.39 ENCOUNTER FOR OTHER SCREENING FOR MALIGNANT NEOPLASM OF BREAST: ICD-10-CM

## 2024-10-23 DIAGNOSIS — N93.9 ABNORMAL UTERINE AND VAGINAL BLEEDING, UNSPECIFIED: ICD-10-CM

## 2024-10-23 DIAGNOSIS — Z86.018 PERSONAL HISTORY OF OTHER BENIGN NEOPLASM: ICD-10-CM

## 2024-10-23 PROCEDURE — 76641 ULTRASOUND BREAST COMPLETE: CPT

## 2024-10-23 PROCEDURE — 77063 BREAST TOMOSYNTHESIS BI: CPT | Mod: 26

## 2024-10-23 PROCEDURE — 77067 SCR MAMMO BI INCL CAD: CPT

## 2024-10-23 PROCEDURE — 99213 OFFICE O/P EST LOW 20 MIN: CPT | Mod: 25

## 2024-10-23 PROCEDURE — 77063 BREAST TOMOSYNTHESIS BI: CPT

## 2024-10-23 PROCEDURE — 76830 TRANSVAGINAL US NON-OB: CPT

## 2024-10-23 PROCEDURE — 76641 ULTRASOUND BREAST COMPLETE: CPT | Mod: 26,50

## 2024-10-23 PROCEDURE — 99459 PELVIC EXAMINATION: CPT

## 2024-10-23 PROCEDURE — G0444 DEPRESSION SCREEN ANNUAL: CPT | Mod: 59

## 2024-10-23 PROCEDURE — 77065 DX MAMMO INCL CAD UNI: CPT | Mod: 26,GG,RT

## 2024-10-23 PROCEDURE — 77067 SCR MAMMO BI INCL CAD: CPT | Mod: 26

## 2024-10-23 PROCEDURE — 99396 PREV VISIT EST AGE 40-64: CPT

## 2024-10-23 PROCEDURE — 77065 DX MAMMO INCL CAD UNI: CPT

## 2024-10-24 LAB — HPV HIGH+LOW RISK DNA PNL CVX: NOT DETECTED

## 2024-10-28 LAB — CYTOLOGY CVX/VAG DOC THIN PREP: NORMAL

## 2024-11-11 ENCOUNTER — NON-APPOINTMENT (OUTPATIENT)
Age: 49
End: 2024-11-11

## 2025-01-16 NOTE — ED ADULT NURSE NOTE - CAS TRG GENERAL NORM CIRC DET
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    Strong peripheral pulses

## 2025-03-19 ENCOUNTER — NON-APPOINTMENT (OUTPATIENT)
Age: 50
End: 2025-03-19

## 2025-07-16 ENCOUNTER — APPOINTMENT (OUTPATIENT)
Dept: OBGYN | Facility: CLINIC | Age: 50
End: 2025-07-16
Payer: COMMERCIAL

## 2025-07-16 ENCOUNTER — NON-APPOINTMENT (OUTPATIENT)
Age: 50
End: 2025-07-16

## 2025-07-16 ENCOUNTER — ASOB RESULT (OUTPATIENT)
Age: 50
End: 2025-07-16

## 2025-07-16 VITALS
HEIGHT: 62 IN | WEIGHT: 145 LBS | SYSTOLIC BLOOD PRESSURE: 130 MMHG | BODY MASS INDEX: 26.68 KG/M2 | DIASTOLIC BLOOD PRESSURE: 91 MMHG | HEART RATE: 85 BPM

## 2025-07-16 PROBLEM — Z30.9 CONTRACEPTION MANAGEMENT: Status: ACTIVE | Noted: 2025-07-16

## 2025-07-16 PROCEDURE — 58100 BIOPSY OF UTERUS LINING: CPT

## 2025-07-16 PROCEDURE — 76857 US EXAM PELVIC LIMITED: CPT

## 2025-07-16 PROCEDURE — 99459 PELVIC EXAMINATION: CPT

## 2025-07-16 PROCEDURE — 99213 OFFICE O/P EST LOW 20 MIN: CPT | Mod: 25

## 2025-07-16 RX ORDER — LEVONORGESTREL/ETHINYL ESTRADIOL AND ETHINYL ESTRADIOL 100-20(84)
0.1-0.02 & 0.01 KIT ORAL DAILY
Qty: 1 | Refills: 1 | Status: ACTIVE | COMMUNITY
Start: 2025-07-16 | End: 1900-01-01

## 2025-07-17 PROBLEM — R93.89 THICKENED ENDOMETRIUM: Status: ACTIVE | Noted: 2025-07-17

## 2025-07-21 LAB — CORE LAB BIOPSY: NORMAL

## 2025-07-29 ENCOUNTER — NON-APPOINTMENT (OUTPATIENT)
Age: 50
End: 2025-07-29